# Patient Record
Sex: FEMALE | Race: OTHER | HISPANIC OR LATINO
[De-identification: names, ages, dates, MRNs, and addresses within clinical notes are randomized per-mention and may not be internally consistent; named-entity substitution may affect disease eponyms.]

---

## 2018-03-28 ENCOUNTER — APPOINTMENT (OUTPATIENT)
Dept: ANTEPARTUM | Facility: CLINIC | Age: 38
End: 2018-03-28
Payer: COMMERCIAL

## 2018-03-28 ENCOUNTER — ASOB RESULT (OUTPATIENT)
Age: 38
End: 2018-03-28

## 2018-03-28 PROBLEM — Z00.00 ENCOUNTER FOR PREVENTIVE HEALTH EXAMINATION: Status: ACTIVE | Noted: 2018-03-28

## 2018-03-28 PROCEDURE — 76813 OB US NUCHAL MEAS 1 GEST: CPT

## 2018-03-28 PROCEDURE — 76801 OB US < 14 WKS SINGLE FETUS: CPT

## 2018-05-21 ENCOUNTER — APPOINTMENT (OUTPATIENT)
Dept: ANTEPARTUM | Facility: CLINIC | Age: 38
End: 2018-05-21
Payer: MEDICAID

## 2018-05-21 ENCOUNTER — ASOB RESULT (OUTPATIENT)
Age: 38
End: 2018-05-21

## 2018-05-21 PROCEDURE — 76811 OB US DETAILED SNGL FETUS: CPT

## 2018-05-30 ENCOUNTER — APPOINTMENT (OUTPATIENT)
Dept: ANTEPARTUM | Facility: CLINIC | Age: 38
End: 2018-05-30

## 2018-06-06 ENCOUNTER — APPOINTMENT (OUTPATIENT)
Dept: ANTEPARTUM | Facility: CLINIC | Age: 38
End: 2018-06-06
Payer: MEDICAID

## 2018-06-06 ENCOUNTER — ASOB RESULT (OUTPATIENT)
Age: 38
End: 2018-06-06

## 2018-06-06 PROCEDURE — 76816 OB US FOLLOW-UP PER FETUS: CPT

## 2018-07-11 ENCOUNTER — APPOINTMENT (OUTPATIENT)
Dept: ANTEPARTUM | Facility: CLINIC | Age: 38
End: 2018-07-11
Payer: MEDICAID

## 2018-07-11 ENCOUNTER — ASOB RESULT (OUTPATIENT)
Age: 38
End: 2018-07-11

## 2018-07-11 PROCEDURE — 76821 MIDDLE CEREBRAL ARTERY ECHO: CPT

## 2018-07-11 PROCEDURE — 76816 OB US FOLLOW-UP PER FETUS: CPT

## 2018-07-24 ENCOUNTER — ASOB RESULT (OUTPATIENT)
Age: 38
End: 2018-07-24

## 2018-07-24 ENCOUNTER — APPOINTMENT (OUTPATIENT)
Dept: ANTEPARTUM | Facility: CLINIC | Age: 38
End: 2018-07-24
Payer: MEDICAID

## 2018-07-24 PROCEDURE — 76819 FETAL BIOPHYS PROFIL W/O NST: CPT

## 2018-08-07 ENCOUNTER — APPOINTMENT (OUTPATIENT)
Dept: ANTEPARTUM | Facility: CLINIC | Age: 38
End: 2018-08-07
Payer: MEDICAID

## 2018-08-07 PROCEDURE — 76816 OB US FOLLOW-UP PER FETUS: CPT

## 2018-08-07 PROCEDURE — 76819 FETAL BIOPHYS PROFIL W/O NST: CPT

## 2018-08-24 ENCOUNTER — ASOB RESULT (OUTPATIENT)
Age: 38
End: 2018-08-24

## 2018-08-24 ENCOUNTER — APPOINTMENT (OUTPATIENT)
Dept: ANTEPARTUM | Facility: CLINIC | Age: 38
End: 2018-08-24
Payer: MEDICAID

## 2018-08-24 PROCEDURE — 76816 OB US FOLLOW-UP PER FETUS: CPT

## 2018-08-24 PROCEDURE — 76819 FETAL BIOPHYS PROFIL W/O NST: CPT

## 2018-08-24 PROCEDURE — 76821 MIDDLE CEREBRAL ARTERY ECHO: CPT

## 2018-09-04 ENCOUNTER — ASOB RESULT (OUTPATIENT)
Age: 38
End: 2018-09-04

## 2018-09-04 ENCOUNTER — APPOINTMENT (OUTPATIENT)
Dept: ANTEPARTUM | Facility: CLINIC | Age: 38
End: 2018-09-04
Payer: MEDICAID

## 2018-09-04 PROCEDURE — 76819 FETAL BIOPHYS PROFIL W/O NST: CPT

## 2018-09-11 ENCOUNTER — ASOB RESULT (OUTPATIENT)
Age: 38
End: 2018-09-11

## 2018-09-11 ENCOUNTER — APPOINTMENT (OUTPATIENT)
Dept: ANTEPARTUM | Facility: CLINIC | Age: 38
End: 2018-09-11
Payer: MEDICAID

## 2018-09-11 PROCEDURE — 76821 MIDDLE CEREBRAL ARTERY ECHO: CPT

## 2018-09-11 PROCEDURE — 93976 VASCULAR STUDY: CPT

## 2018-09-11 PROCEDURE — 76816 OB US FOLLOW-UP PER FETUS: CPT

## 2018-09-11 PROCEDURE — 76820 UMBILICAL ARTERY ECHO: CPT

## 2018-09-11 PROCEDURE — 76819 FETAL BIOPHYS PROFIL W/O NST: CPT

## 2022-01-04 ENCOUNTER — EMERGENCY (EMERGENCY)
Facility: HOSPITAL | Age: 42
LOS: 1 days | Discharge: HOME | End: 2022-01-04
Attending: EMERGENCY MEDICINE
Payer: MEDICAID

## 2022-01-04 ENCOUNTER — TRANSCRIPTION ENCOUNTER (OUTPATIENT)
Age: 42
End: 2022-01-04

## 2022-01-04 VITALS
SYSTOLIC BLOOD PRESSURE: 121 MMHG | RESPIRATION RATE: 18 BRPM | DIASTOLIC BLOOD PRESSURE: 61 MMHG | OXYGEN SATURATION: 100 % | TEMPERATURE: 99 F | HEART RATE: 83 BPM

## 2022-01-04 LAB
ANION GAP SERPL CALC-SCNC: 16 MMOL/L — SIGNIFICANT CHANGE UP (ref 5–17)
BASOPHILS # BLD AUTO: 0.03 K/UL — SIGNIFICANT CHANGE UP (ref 0–0.2)
BASOPHILS NFR BLD AUTO: 0.4 % — SIGNIFICANT CHANGE UP (ref 0–2)
BUN SERPL-MCNC: 13.8 MG/DL — SIGNIFICANT CHANGE UP (ref 8–20)
CALCIUM SERPL-MCNC: 8.9 MG/DL — SIGNIFICANT CHANGE UP (ref 8.6–10.2)
CHLORIDE SERPL-SCNC: 102 MMOL/L — SIGNIFICANT CHANGE UP (ref 98–107)
CO2 SERPL-SCNC: 20 MMOL/L — LOW (ref 22–29)
CREAT SERPL-MCNC: 0.54 MG/DL — SIGNIFICANT CHANGE UP (ref 0.5–1.3)
D DIMER BLD IA.RAPID-MCNC: 520 NG/ML DDU — HIGH
EOSINOPHIL # BLD AUTO: 0.21 K/UL — SIGNIFICANT CHANGE UP (ref 0–0.5)
EOSINOPHIL NFR BLD AUTO: 3 % — SIGNIFICANT CHANGE UP (ref 0–6)
GLUCOSE SERPL-MCNC: 111 MG/DL — HIGH (ref 70–99)
HCG SERPL-ACNC: <4 MIU/ML — SIGNIFICANT CHANGE UP
HCT VFR BLD CALC: 32.8 % — LOW (ref 34.5–45)
HGB BLD-MCNC: 9.8 G/DL — LOW (ref 11.5–15.5)
IMM GRANULOCYTES NFR BLD AUTO: 1.1 % — SIGNIFICANT CHANGE UP (ref 0–1.5)
LYMPHOCYTES # BLD AUTO: 1.6 K/UL — SIGNIFICANT CHANGE UP (ref 1–3.3)
LYMPHOCYTES # BLD AUTO: 22.9 % — SIGNIFICANT CHANGE UP (ref 13–44)
MCHC RBC-ENTMCNC: 22.7 PG — LOW (ref 27–34)
MCHC RBC-ENTMCNC: 29.9 GM/DL — LOW (ref 32–36)
MCV RBC AUTO: 75.9 FL — LOW (ref 80–100)
MONOCYTES # BLD AUTO: 0.56 K/UL — SIGNIFICANT CHANGE UP (ref 0–0.9)
MONOCYTES NFR BLD AUTO: 8 % — SIGNIFICANT CHANGE UP (ref 2–14)
NEUTROPHILS # BLD AUTO: 4.51 K/UL — SIGNIFICANT CHANGE UP (ref 1.8–7.4)
NEUTROPHILS NFR BLD AUTO: 64.6 % — SIGNIFICANT CHANGE UP (ref 43–77)
PLATELET # BLD AUTO: 273 K/UL — SIGNIFICANT CHANGE UP (ref 150–400)
POTASSIUM SERPL-MCNC: 4.1 MMOL/L — SIGNIFICANT CHANGE UP (ref 3.5–5.3)
POTASSIUM SERPL-SCNC: 4.1 MMOL/L — SIGNIFICANT CHANGE UP (ref 3.5–5.3)
RBC # BLD: 4.32 M/UL — SIGNIFICANT CHANGE UP (ref 3.8–5.2)
RBC # FLD: 16.9 % — HIGH (ref 10.3–14.5)
SODIUM SERPL-SCNC: 138 MMOL/L — SIGNIFICANT CHANGE UP (ref 135–145)
TROPONIN T SERPL-MCNC: <0.01 NG/ML — SIGNIFICANT CHANGE UP (ref 0–0.06)
WBC # BLD: 6.99 K/UL — SIGNIFICANT CHANGE UP (ref 3.8–10.5)
WBC # FLD AUTO: 6.99 K/UL — SIGNIFICANT CHANGE UP (ref 3.8–10.5)

## 2022-01-04 PROCEDURE — 80048 BASIC METABOLIC PNL TOTAL CA: CPT

## 2022-01-04 PROCEDURE — 85025 COMPLETE CBC W/AUTO DIFF WBC: CPT

## 2022-01-04 PROCEDURE — 93010 ELECTROCARDIOGRAM REPORT: CPT

## 2022-01-04 PROCEDURE — 93005 ELECTROCARDIOGRAM TRACING: CPT

## 2022-01-04 PROCEDURE — 71046 X-RAY EXAM CHEST 2 VIEWS: CPT

## 2022-01-04 PROCEDURE — U0003: CPT

## 2022-01-04 PROCEDURE — U0005: CPT

## 2022-01-04 PROCEDURE — 85379 FIBRIN DEGRADATION QUANT: CPT

## 2022-01-04 PROCEDURE — 99284 EMERGENCY DEPT VISIT MOD MDM: CPT | Mod: 25

## 2022-01-04 PROCEDURE — 84702 CHORIONIC GONADOTROPIN TEST: CPT

## 2022-01-04 PROCEDURE — 71275 CT ANGIOGRAPHY CHEST: CPT | Mod: MA

## 2022-01-04 PROCEDURE — 36415 COLL VENOUS BLD VENIPUNCTURE: CPT

## 2022-01-04 PROCEDURE — 71046 X-RAY EXAM CHEST 2 VIEWS: CPT | Mod: 26

## 2022-01-04 PROCEDURE — 99285 EMERGENCY DEPT VISIT HI MDM: CPT

## 2022-01-04 PROCEDURE — 84484 ASSAY OF TROPONIN QUANT: CPT

## 2022-01-04 NOTE — ED ADULT TRIAGE NOTE - PAIN: PRESENCE, MLM
CAD (coronary artery disease)    CAD (coronary artery disease)    Diabetes    Diabetes type 2, uncontrolled    Hyperlipemia    Hypertension    MI (myocardial infarction) chest/complains of pain/discomfort

## 2022-01-04 NOTE — ED PROVIDER NOTE - PROGRESS NOTE DETAILS
CTA neg for PE.  Pt advised of CT results and instructed to f/u as outpt. Pt stable for d/c and d./c instructions reviewed in [facility]

## 2022-01-04 NOTE — ED ADULT NURSE NOTE - OBJECTIVE STATEMENT
assumed pt care at 2028 via language line .  Pt reports feeling tired for the last week and short of breath   X 2 days.  Currently she does not feel any headache or chest pressure.    She feels mildly short of breath.

## 2022-01-04 NOTE — ED ADULT NURSE NOTE - CAS EDP DISCH TYPE
Addendum  created 01/15/18 1101 by Lillian Gerard MD    Anesthesia Intra Blocks edited, Anesthesia Intra Meds edited, Sign clinical note Home

## 2022-01-04 NOTE — ED ADULT TRIAGE NOTE - CHIEF COMPLAINT QUOTE
BBIEMS from urgent care for abnormal EKG. Presented to urgent care initially for SOB and some mild chest pain. States it started earlier today and it is a little better now. Denies cardiac or respiratory history and denies sick contacts.

## 2022-01-04 NOTE — ED PROVIDER NOTE - PATIENT PORTAL LINK FT
You can access the FollowMyHealth Patient Portal offered by [facility] by registering at the following website: [email or web address]. By joining [employer]’s FollowMyHealth portal, you will also be able to view your health information using other applications (apps) compatible with our system.

## 2022-01-04 NOTE — ED PROVIDER NOTE - OBJECTIVE STATEMENT
42F no pmhx p/w SOB for the past week and worse for the past 3 days.  Otherwise denies pain, bleeding, SOB, abdominal pain or fevers.  Denies other pertinent medical problems.  Denies any other substance use. 42F no pmhx p/w SOB for the past week and worse for the past 3 days.  Otherwise denies pain, bleeding, SOB, abdominal pain or fevers.  Denies other pertinent medical problems.  Denies any other substance use.     ID [id number]

## 2022-01-04 NOTE — ED PROVIDER NOTE - NSFOLLOWUPINSTRUCTIONS_ED_ALL_ED_FT
Follow up with your doctor next 1-2 days  Return sooner for any problems    Shortness of breath    Shortness of breath (dyspnea) means you have trouble breathing and could indicate a medical problem. Causes include lung disease, heart disease, low amount of red blood cells (anemia), poor physical fitness, being overweight, smoking, etc. Your health care provider today may not be able to find a cause for your shortness of breath after your exam. In this case, it is important to have a follow-up exam with your primary care physician as instructed. If medicines were prescribed, take them as directed for the full length of time directed. Refrain from tobacco products.    SEEK IMMEDIATE MEDICAL CARE IF YOU HAVE ANY OF THE FOLLOWING SYMPTOMS: worsening shortness of breath, chest pain, back pain, abdominal pain, fever, coughing up blood, lightheadedness/dizziness.

## 2022-01-04 NOTE — ED PROVIDER NOTE - ATTENDING CONTRIBUTION TO CARE
42F no pmhx p/w SOB intermittently for the past week and worse for the past 3 days.  Otherwise denies pain, bleeding, SOB, abdominal pain or fevers.  Denies cardiac history. Denies dvt/pe history. Non smoker. Is vaccinated against covid   ID [id number]  Ap - well appearing. ekg no ischemic changes. HEART risk low, will get ddimer as low risk. reassess

## 2022-01-05 VITALS
SYSTOLIC BLOOD PRESSURE: 113 MMHG | OXYGEN SATURATION: 100 % | DIASTOLIC BLOOD PRESSURE: 67 MMHG | HEART RATE: 85 BPM | TEMPERATURE: 98 F | RESPIRATION RATE: 18 BRPM

## 2022-01-05 LAB — SARS-COV-2 RNA SPEC QL NAA+PROBE: SIGNIFICANT CHANGE UP

## 2022-01-05 PROCEDURE — 71275 CT ANGIOGRAPHY CHEST: CPT | Mod: 26,MA

## 2022-08-22 NOTE — ED ADULT TRIAGE NOTE - MEANS OF ARRIVAL
Bed: 05  Expected date: 8/22/22  Expected time:   Means of arrival:   Comments:  1 st triage    stretcher

## 2023-01-15 NOTE — ED ADULT NURSE NOTE - HIV OFFER
[id number] [place], [place] [address]ist   Progress Note    Admitting Date and Time: 1/14/2023 12:18 AM  Admit Dx: Shortness of breath [R06.02]  Acute chest pain [R07.9]  Pulmonary embolism on left (HCC) [I26.99]  Acute pulmonary embolism without acute cor pulmonale, unspecified pulmonary embolism type ([address] [I26.99]    Subjective:    Pt feels better as far as pain is concerned. Toradol helps pain better than morphine. Per RN: remains on room air.      labetalol  200 mg Oral 3 times per day    sodium chloride flush  5-40 mL IntraVENous 2 times per day    apixaban  10 mg Oral BID    Followed by    Clemente Russo ON 1/21/2023] apixaban  5 mg Oral BID    lidocaine  1 patch TransDERmal Daily     sodium chloride flush, 5-40 mL, PRN  sodium chloride, , PRN  ondansetron, 4 mg, Q8H PRN   Or  ondansetron, 4 mg, Q6H PRN  polyethylene glycol, 17 g, Daily PRN  acetaminophen, 650 mg, Q6H PRN   Or  acetaminophen, 650 mg, Q6H PRN  perflutren lipid microspheres, 1.5 mL, ONCE PRN  morphine, 2 mg, Q6H PRN  benzonatate, 100 mg, TID PRN  HYDROcodone-acetaminophen, 1 tablet, Q6H PRN  ketorolac, 30 mg, Q6H PRN         Objective:    BP (!) 148/102   Pulse (!) 109   Temp 98.4 °F (36.9 °C) (Axillary)   Resp 18   Ht 5' 7.5\" (1.715 m)   Wt 190 lb (86.2 kg)   LMP 01/04/2023 (Approximate)   SpO2 100%   BMI 29.32 kg/m²   General Appearance: alert and oriented to person, place and time and in no acute distress  Skin: warm and dry  Head: normocephalic and atraumatic  Eyes: pupils equal, round, and reactive to light, extraocular eye movements intact, conjunctivae normal  Neck: neck supple and non tender without mass   Pulmonary/Chest: clear to auscultation bilaterally- no wheezes, rales or rhonchi, normal air movement, no respiratory distress  Cardiovascular: normal rate, normal S1 and S2 and no carotid bruits  Abdomen: soft, non-tender, non-distended, normal bowel sounds, no masses or organomegaly  Extremities: no cyanosis, no clubbing and no edema  Neurologic: no cranial nerve deficit and speech normal      Recent Labs     01/14/23 0036      K 3.8      CO2 22   BUN 10   CREATININE 0.9   GLUCOSE 189*   CALCIUM 9.4       Recent Labs     01/14/23 0036   ALKPHOS 87   PROT 7.4   LABALBU 3.9   BILITOT <0.2   AST 10   ALT 8       Recent Labs     01/14/23 0036   WBC 11.0   RBC 3.91   HGB 9.6*   HCT 32.0*   MCV 81.8   MCH 24.6*   MCHC 30.0*   RDW 17.0*      MPV 9.7       Troponin [[id number]] Collected: 01/14/23 0036     Specimen: Blood Updated: 01/14/23 0219      Troponin, High Sensitivity 9 ng/L      CK [[id number]] Collected: 01/14/23 0036     Specimen: Blood Updated: 01/14/23 0219      Total CK 63 U/L        Lipase [[id number]] Collected: 01/14/23 0036     Specimen: Blood Updated: 01/14/23 0219      Lipase 41 U/L      Lactic Acid [[id number]] Collected: 01/14/23 0036     Specimen: Blood Updated: 01/14/23 0219      Lactic Acid 2.0 mmol/L          Urine Drug Screen [[id number]] (Abnormal) Collected: 01/14/23 0036     Specimen: Urine Updated: 01/14/23 0219      Amphetamine Screen, Urine NOT DETECTED      Barbiturate Screen, Ur NOT DETECTED      Benzodiazepine Screen, Urine NOT DETECTED      Cannabinoid Scrn, Ur NOT DETECTED      Cocaine Metabolite Screen, Urine POSITIVE      Opiate Scrn, Ur NOT DETECTED      PCP Screen, Urine NOT DETECTED      Methadone Screen, Urine NOT DETECTED      Oxycodone Urine NOT DETECTED      FENTANYL SCREEN, URINE NOT DETECTED      Drug Screen Comment: see below       Radiology:   US DUP LOWER EXTREMITIES BILATERAL VENOUS   Final Result   No evidence of DVT in either lower extremity. CTA PULMONARY W CONTRAST   Final Result   Left lower lobe segmental pulmonary embolism. Wedge-shaped consolidation in   this region, concerning for developing pulmonary infarct. No evidence of   right heart strain. Critical results were called by Dr. Vita Guerra MD to Dr. Ariana Daley on   1/14/2023 at 02:54.          XR CHEST PORTABLE   Final Result   No acute process. Assessment:  Principal Problem:    Pulmonary embolism on left [facility])  Active Problems:    Cocaine abuse ([facility]    Acute pulmonary embolism without acute cor pulmonale, unspecified pulmonary embolism type ([facility]    Uncontrolled hypertension  Resolved Problems:    * No resolved hospital problems. *      Plan:      Acute pulmonary embolism  -appreciated pulmonary input. Transitioned Lovenox to Eliquis.  -Lidoderm and Toradol for pain control per pulmonary. -US of legs negative of DVT. -Echo ordered to evaluate for RV strain is pending. CTA   -Family Hx of clotting with DVT/PE in her sitter. Factor 5 Leiden lab ordered by monie.    2. Uncontrolled hypertension  -BP uncontrolled on admission  -labetolol 200 mg q8 reordered. 3. Cocaine abuse  -cessation counseled. NOTE: This report was transcribed using voice recognition software. Every effort was made to ensure accuracy; however, inadvertent computerized transcription errors may be present.      Electronically signed by Darnell Galvan MD on 1/14/2023 at 10:35 PM Yes, get tested

## 2023-02-27 ENCOUNTER — APPOINTMENT (OUTPATIENT)
Dept: OBGYN | Facility: CLINIC | Age: 43
End: 2023-02-27
Payer: MEDICAID

## 2023-02-27 VITALS
SYSTOLIC BLOOD PRESSURE: 102 MMHG | DIASTOLIC BLOOD PRESSURE: 62 MMHG | BODY MASS INDEX: 24.8 KG/M2 | HEIGHT: 63 IN | WEIGHT: 140 LBS

## 2023-02-27 DIAGNOSIS — Z12.31 ENCOUNTER FOR SCREENING MAMMOGRAM FOR MALIGNANT NEOPLASM OF BREAST: ICD-10-CM

## 2023-02-27 DIAGNOSIS — Z01.419 ENCOUNTER FOR GYNECOLOGICAL EXAMINATION (GENERAL) (ROUTINE) W/OUT ABNORMAL FINDINGS: ICD-10-CM

## 2023-02-27 DIAGNOSIS — Z80.8 FAMILY HISTORY OF MALIGNANT NEOPLASM OF OTHER ORGANS OR SYSTEMS: ICD-10-CM

## 2023-02-27 DIAGNOSIS — Z78.9 OTHER SPECIFIED HEALTH STATUS: ICD-10-CM

## 2023-02-27 PROCEDURE — 99386 PREV VISIT NEW AGE 40-64: CPT

## 2023-02-27 NOTE — PHYSICAL EXAM
[Appropriately responsive] : appropriately responsive [Alert] : alert [No Acute Distress] : no acute distress [Soft] : soft [Non-tender] : non-tender [Non-distended] : non-distended [Oriented x3] : oriented x3 [Examination Of The Breasts] : a normal appearance [No Discharge] : no discharge [No Masses] : no breast masses were palpable [Labia Majora] : normal [Labia Minora] : normal [No Bleeding] : There was no active vaginal bleeding [Normal] : normal [Uterine Adnexae] : normal [Chaperone Present] : A chaperone was present in the examining room during all aspects of the physical examination

## 2023-03-01 ENCOUNTER — RESULT REVIEW (OUTPATIENT)
Age: 43
End: 2023-03-01

## 2023-03-01 ENCOUNTER — APPOINTMENT (OUTPATIENT)
Dept: MAMMOGRAPHY | Facility: CLINIC | Age: 43
End: 2023-03-01
Payer: MEDICAID

## 2023-03-01 ENCOUNTER — OUTPATIENT (OUTPATIENT)
Dept: OUTPATIENT SERVICES | Facility: HOSPITAL | Age: 43
LOS: 1 days | End: 2023-03-01
Payer: MEDICAID

## 2023-03-01 DIAGNOSIS — Z12.31 ENCOUNTER FOR SCREENING MAMMOGRAM FOR MALIGNANT NEOPLASM OF BREAST: ICD-10-CM

## 2023-03-01 LAB — HPV HIGH+LOW RISK DNA PNL CVX: NOT DETECTED

## 2023-03-01 PROCEDURE — 77067 SCR MAMMO BI INCL CAD: CPT | Mod: 26

## 2023-03-01 PROCEDURE — 77067 SCR MAMMO BI INCL CAD: CPT

## 2023-03-01 PROCEDURE — 77063 BREAST TOMOSYNTHESIS BI: CPT | Mod: 26

## 2023-03-01 PROCEDURE — 77063 BREAST TOMOSYNTHESIS BI: CPT

## 2023-03-02 PROBLEM — Z78.9 NON-SMOKER: Status: ACTIVE | Noted: 2023-02-27

## 2023-03-02 PROBLEM — Z80.8 FAMILY HISTORY OF MALIGNANT NEOPLASM OF THYROID: Status: ACTIVE | Noted: 2023-02-27

## 2023-03-02 PROBLEM — Z78.9 EXERCISES OCCASIONALLY: Status: ACTIVE | Noted: 2023-02-27

## 2023-03-02 NOTE — HISTORY OF PRESENT ILLNESS
[Y] : Positive pregnancy history [Menarche Age: ____] : age at menarche was [unfilled] [No] : Patient does not have concerns regarding sex [Currently Active] : currently active [Mammogramdate] : 2006 [TextBox_19] : PER PT, HAD CYST ON LEFT BREAST [PapSmeardate] : 2018 [TextBox_31] : wnl per pt [LMPDate] : 02/20/23 [PGHxTotal] : 4 [[facility]] : 4 [[facility]] : 4 [FreeTextEntry1] : 02/20/23

## 2023-03-02 NOTE — END OF VISIT
[FreeTextEntry3] : I, Ren Duffy solely acted as scribe for Dr. Estefani Flowers on 02/27/2023 \par All medical entries made by the Scribe were at my, Dr. Flowers’s, direction and personally\par dictated by me on 02/27/2023 . I have reviewed the chart and agree that the record\par accurately reflects my personal performance of the history, physical exam, assessment and plan. I\par have also personally directed, reviewed, and agreed with the chart.

## 2023-03-02 NOTE — DISCUSSION/SUMMARY
[FreeTextEntry1] : Benign breast and pelvic exam. Pap done today. \par \par Prescription for mammogram screening given.\par \par Self-breast exam reviewed.\par \par She will follow up annually and as needed.\par

## 2023-03-03 LAB — CYTOLOGY CVX/VAG DOC THIN PREP: NORMAL
